# Patient Record
Sex: FEMALE | Race: BLACK OR AFRICAN AMERICAN
[De-identification: names, ages, dates, MRNs, and addresses within clinical notes are randomized per-mention and may not be internally consistent; named-entity substitution may affect disease eponyms.]

---
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
## 2025-01-01 ENCOUNTER — HOSPITAL ENCOUNTER (EMERGENCY)
Dept: HOSPITAL 15 - ER | Age: 20
LOS: 1 days | Discharge: HOME | End: 2025-01-02
Payer: MEDICAID

## 2025-01-01 VITALS — HEIGHT: 65 IN | BODY MASS INDEX: 33.39 KG/M2 | WEIGHT: 200.4 LBS

## 2025-01-01 VITALS
RESPIRATION RATE: 19 BRPM | SYSTOLIC BLOOD PRESSURE: 107 MMHG | HEART RATE: 105 BPM | TEMPERATURE: 102.6 F | OXYGEN SATURATION: 98 % | DIASTOLIC BLOOD PRESSURE: 65 MMHG

## 2025-01-01 DIAGNOSIS — H66.91: Primary | ICD-10-CM

## 2025-01-01 DIAGNOSIS — Z79.899: ICD-10-CM

## 2025-01-01 PROCEDURE — 99283 EMERGENCY DEPT VISIT LOW MDM: CPT

## 2025-01-01 PROCEDURE — 96372 THER/PROPH/DIAG INJ SC/IM: CPT

## 2025-01-01 RX ADMIN — KETOROLAC TROMETHAMINE ONE MG: 60 INJECTION, SOLUTION INTRAMUSCULAR at 23:28

## 2025-01-01 NOTE — ED.PDOC
Eye-HPI


HPI Comments


This is a 19-year-old female presents to the ED chief complaint right ear pain. 

Patient states she has been sick for the past 3 or 4 days over the past 24 hours

she has noticed increasing right ear pain.  Does report fevers.  Nausea vomiting

diarrhea or changes in hearing.


Chief Complaint:  Earache


Time Seen by MD:  20:09


Primary Care Provider:  Jose A Fonseca Notes:  Nurses Notes, Medications, Allergies


Allergies:  


Coded Allergies:  


     NO KNOWN ALLERGIES (Unverified , 8/20/21)


Home Meds


Active Scripts


Amoxicillin & Pot Clavulanate (AUGMENTIN TABLET) 875 Mg Tb, 1 TAB PO BID for 10 

Days, #20 TAB


   Prov:PRIMO ANDRADE FNAIDA         1/1/25


Information Source:  Patient


Mode of Arrival:  Ambulatory





Past Medical History


PAST MEDICAL HISTORY:  Denies


Surgical History:  Denies all surgeries


GYN History:  No Pertinent GYN History





Family History


Family History:  Unknown





Social History


Smoker:  Non-Smoker


Alcohol:  Denies ETOH Use


Drugs:  Denies Drug Use


Lives In:  Home





Constitutional:  reports: fever


EENTM:  reports: ear pain (Right); denies: blurred vision, double vision, ear 

bleeding, ear discharge, ear drainage, ear ringing, eye pain, eye redness, 

hearing loss, mouth pain, mouth swelling, nasal discharge, nose bleeding, nose 

congestion, nose pain, photophobia, tearing, throat pain, throat swelling, voice

changes, others


Respiratory:  denies: cough, hemoptysis, orthopnea, SOB at rest, shortness of br

eath, SOB with excertion, stridor, wheezing, others


Cardiovascular:  denies: chest pain, dizzy spells, diaphoresis, Dyspnea on 

exertion, edema, irregular heart beat, left arm pain, lightheadedness, 

palpitations, PND, syncope, others


Gastrointestinal:  denies: abdomen distended, abdominal pain, blood streaked 

bowels, constipated, diarrhea, dysphagia, difficulty swallowing, hematemesis, 

melena, nausea, poor appetite, poor fluid intake, rectal bleeding, rectal pain, 

vomiting, others


Genitourinary:  denies: abnormal vagina bleeding, burning, dyspareunia, dysuria,

flank pain, frequency, hematuria, incontinence, pain, pregnant, vagina 

discharge, urgency, others


Neurological:  denies: dizziness, fainting, headache, left sided numbness, left 

sided weakness, numbness, paresthesia, pre-existing deficit, right sided 

numbness, right sided weakness, seizure, speech problems, tingling, tremors, 

weakness, others


Musculoskeletal:  denies: back pain, gout, joint pain, joint swelling, muscle 

pain, muscle stiffness, neck pain, others


Integumetry:  denies: bruises, change in color, change in hair/nails, dryness, 

laceration, lesions, lumps, rash, wounds, others


Allergic/Immunocompromised:  denies: Difficulty Healing, Frequent Infections, 

Hives, Itching, others


Hematologic/Lymphatic:  denies: anemia, blood clots, easy bleeding, easy 

bruising, swollen glands, others


Endocrine:  denies: excessive hunger, excessive sweating, excessive thirst, 

excessive urination, flushing, intolerance to cold, intolerance to heat, 

unexplained weight gain, unexplained weight loss, others


Psychiatric:  denies: anxiety, bipolar disorder, depression, hopeless, panic 

disorder, schizophrenia, sleepless, suicidal, others





Physical Exam


General Appearance:  No Apparent Distress, Normal


HEENT:  Pharynx Normal, TM Abnormal (R) (Erythemic bulging TM intact canal 

erythema or edema.  No noted Drainage.)


Neck:  Full Range of Motion, Non-Tender, Normal, Normal Inspection


Respiratory:  Chest Non-Tender, Lungs Clear, No Accessory Muscle Use, No 

Respiratory Distress, Normal Breath Sounds


Cardiovascular:  No Edema, No JVD, No Murmur, No Gallop, Normal Peripheral 

Pulses, Regular Rate/Rhythm


Breast Exam:  Deferred


Gastrointestinal:  No Organomegaly, Non Tender, No Pulsatile Mass, Normal Bowel 

Sounds, Soft


Genitalia:  Deferred


Pelvic:  Deferred


Rectal:  Deferred


Extremities:  No calf tenderness, Normal capillary refill, Normal inspection, 

Normal range of motion, Non-tender, No pedal edema


Musculoskeletal :  


   Apperance:  Normal


Neurologic:  Alert, CNs II-XII nml as Tested, No Motor Deficits, Normal Affect, 

Normal Mood, No Sensory Deficits


Cerebellar Function:  Normal


Reflexes:  Normal


Skin:  Dry, Normal Color, Warm


Lymphatic:  No Adenopathy





Was a procedure done?


Was a procedure done?:  No





EENT DIFF


Eye:  N/A


Ear:  Cerumen Impaction, Otitis Media, Perforation


Sore Throat:  Peritonsillar Abscess, Peritonsillar Cellulitis, Streptococcal, 

Viral Pharyngitis





X-Ray, Labs, Meds, VS





                                   Vital Signs








  Date Time  Temp Pulse Resp B/P (MAP) Pulse Ox O2 Delivery O2 Flow Rate FiO2


 


1/1/25 23:17  105 19  98 Room Air  


 


1/1/25 23:17 102.6 105 19 107/65 (79) 98   





 102.6       


 


1/1/25 20:55 99.1 99 16 122/83 (96) 96   








                               Current Medications








 Medications


  (Trade)  Dose


 Ordered  Sig/Ryland


 Route  Start Time


 Stop Time Status Last Admin


 


 Ketorolac


 Tromethamine


  (Toradol


 Injection)  60 mg  ONCE  ONCE


 IM  1/1/25 23:30


 1/1/25 23:31 DC 1/1/25 23:28











X-Ray, Labs, Meds, VS Comment


Otitis media cefdinir and Orapred.  Over-the-counter Children's Tylenol or 

Motrin as needed for pain and fever.  Follow up with PCP within 2-3 days if no 

improvement.  Rest increase p.o. fluids with electrolytes.  Return precautions bertha

yesica mother indicated understanding agrees with discharge plan of care.


Time of 1ST Reevaluation:  23:06


Reevaluation 1ST:  Improved


Patient Education/Counseling:  Diagnosis, Treatment, Prognosis, Need For Follow 

Up


Family Education/Counseling:  No Family Present





Departure 1


Departure


Time of Disposition:  23:06


Impression:  


   Primary Impression:  


   Otitis media


   Qualified Codes:  H66.90 - Otitis media, unspecified, unspecified ear


Disposition:  01 HOME / SELF CARE / HOMELESS


Condition:  Stable


e-Prescriptions


Amoxicillin & Pot Clavulanate (AUGMENTIN TABLET) 875 Mg Tb


1 TAB PO BID for 10 Days, #20 TAB


   Prov: PRIMO ANDRADE         1/1/25


Discharged With:  Self





Critical Care Note


Critical Care Time?:  No





Stability


Stability form required:  PRIMO Gannon                 Jan 1, 2025 23:08